# Patient Record
Sex: MALE
[De-identification: names, ages, dates, MRNs, and addresses within clinical notes are randomized per-mention and may not be internally consistent; named-entity substitution may affect disease eponyms.]

---

## 2024-07-08 ENCOUNTER — RX ONLY (RX ONLY)
Age: 79
End: 2024-07-08

## 2024-07-08 ENCOUNTER — APPOINTMENT (OUTPATIENT)
Dept: URBAN - METROPOLITAN AREA CLINIC 305 | Age: 79
Setting detail: DERMATOLOGY
End: 2024-07-08

## 2024-07-08 PROBLEM — D04.4 CARCINOMA IN SITU OF SKIN OF SCALP AND NECK: Status: ACTIVE | Noted: 2024-07-08

## 2024-07-08 PROCEDURE — OTHER ADDITIONAL NOTES: OTHER

## 2024-07-08 PROCEDURE — 99213 OFFICE O/P EST LOW 20 MIN: CPT

## 2024-07-08 RX ORDER — FLUOROURACIL 2 G/40G
CREAM TOPICAL
Qty: 40 | Refills: 0 | Status: ERX | COMMUNITY
Start: 2024-07-08

## 2024-07-08 NOTE — HPI: SKIN LESION (SQUAMOUS CELL CARCINOMA)
Is This A New Presentation, Or A Follow-Up?: Squamous Cell Carcinoma
When Was Squamous Cell Carcinoma Biopsied? (Optional): Per [facility] 4/20/24

## 2024-07-08 NOTE — PROCEDURE: ADDITIONAL NOTES
Additional Notes: Options were discussed, including surgery versus off label topical therapy\\n\\nSurgery would likely require a skin graft or large flap closure. For this reason, patient would prefer topical therapy. \\n\\nEfudex 5% daily, Monday through Friday, for six weeks \\n\\nRecheck in 12 weeks
Detail Level: Simple
Render Risk Assessment In Note?: no
Patient Management Risk Assessment: Moderate

## 2024-10-31 ENCOUNTER — APPOINTMENT (OUTPATIENT)
Dept: URBAN - METROPOLITAN AREA CLINIC 305 | Age: 79
Setting detail: DERMATOLOGY
End: 2024-10-31

## 2024-10-31 PROBLEM — D04.4 CARCINOMA IN SITU OF SKIN OF SCALP AND NECK: Status: ACTIVE | Noted: 2024-10-31

## 2024-10-31 PROCEDURE — OTHER ADDITIONAL NOTES: OTHER

## 2024-10-31 PROCEDURE — 99213 OFFICE O/P EST LOW 20 MIN: CPT

## 2024-10-31 NOTE — PROCEDURE: ADDITIONAL NOTES
Detail Level: Simple
Patient Management Risk Assessment: Moderate
Additional Notes: Great response to the topical therapy. No clinically evident lesion. No indication to re-biopsy. Topical treatment appears successful. Follow up with general medical dermatology in 3 to 6 months.
Render Risk Assessment In Note?: no